# Patient Record
Sex: MALE | Race: ASIAN | NOT HISPANIC OR LATINO | URBAN - METROPOLITAN AREA
[De-identification: names, ages, dates, MRNs, and addresses within clinical notes are randomized per-mention and may not be internally consistent; named-entity substitution may affect disease eponyms.]

---

## 2018-03-28 ENCOUNTER — EMERGENCY (EMERGENCY)
Facility: HOSPITAL | Age: 73
LOS: 1 days | Discharge: HOME | End: 2018-03-28
Attending: EMERGENCY MEDICINE | Admitting: EMERGENCY MEDICINE
Payer: MEDICAID

## 2018-03-28 VITALS
TEMPERATURE: 98 F | OXYGEN SATURATION: 97 % | DIASTOLIC BLOOD PRESSURE: 72 MMHG | RESPIRATION RATE: 16 BRPM | HEART RATE: 68 BPM | SYSTOLIC BLOOD PRESSURE: 136 MMHG

## 2018-03-28 PROCEDURE — 99284 EMERGENCY DEPT VISIT MOD MDM: CPT

## 2018-03-28 RX ORDER — SODIUM CHLORIDE 9 MG/ML
1000 INJECTION INTRAMUSCULAR; INTRAVENOUS; SUBCUTANEOUS ONCE
Qty: 0 | Refills: 0 | Status: COMPLETED | OUTPATIENT
Start: 2018-03-28 | End: 2018-03-28

## 2018-03-28 RX ADMIN — SODIUM CHLORIDE 1000 MILLILITER(S): 9 INJECTION INTRAMUSCULAR; INTRAVENOUS; SUBCUTANEOUS at 23:54

## 2018-03-28 NOTE — ED ADULT TRIAGE NOTE - CHIEF COMPLAINT QUOTE
Pt comes in for c/o LLQ abd pain after taking 1st dose of amoxicillin today, accompanied with n/v. Pt symptoms now resolved and pt appears in no acute distress, vs as noted

## 2018-03-29 ENCOUNTER — TRANSCRIPTION ENCOUNTER (OUTPATIENT)
Age: 73
End: 2018-03-29

## 2018-03-29 LAB
ALBUMIN SERPL ELPH-MCNC: 4.1 G/DL — SIGNIFICANT CHANGE UP (ref 3.3–5)
ALP SERPL-CCNC: 29 U/L — LOW (ref 40–120)
ALT FLD-CCNC: 15 U/L — SIGNIFICANT CHANGE UP (ref 4–41)
APPEARANCE UR: CLEAR — SIGNIFICANT CHANGE UP
AST SERPL-CCNC: 21 U/L — SIGNIFICANT CHANGE UP (ref 4–40)
BASOPHILS # BLD AUTO: 0.02 K/UL — SIGNIFICANT CHANGE UP (ref 0–0.2)
BASOPHILS NFR BLD AUTO: 0.2 % — SIGNIFICANT CHANGE UP (ref 0–2)
BILIRUB SERPL-MCNC: 0.8 MG/DL — SIGNIFICANT CHANGE UP (ref 0.2–1.2)
BILIRUB UR-MCNC: NEGATIVE — SIGNIFICANT CHANGE UP
BLOOD UR QL VISUAL: NEGATIVE — SIGNIFICANT CHANGE UP
BUN SERPL-MCNC: 18 MG/DL — SIGNIFICANT CHANGE UP (ref 7–23)
CALCIUM SERPL-MCNC: 8.4 MG/DL — SIGNIFICANT CHANGE UP (ref 8.4–10.5)
CHLORIDE SERPL-SCNC: 102 MMOL/L — SIGNIFICANT CHANGE UP (ref 98–107)
CO2 SERPL-SCNC: 24 MMOL/L — SIGNIFICANT CHANGE UP (ref 22–31)
COLOR SPEC: SIGNIFICANT CHANGE UP
CREAT SERPL-MCNC: 1.02 MG/DL — SIGNIFICANT CHANGE UP (ref 0.5–1.3)
EOSINOPHIL # BLD AUTO: 0.13 K/UL — SIGNIFICANT CHANGE UP (ref 0–0.5)
EOSINOPHIL NFR BLD AUTO: 1.1 % — SIGNIFICANT CHANGE UP (ref 0–6)
GLUCOSE SERPL-MCNC: 108 MG/DL — HIGH (ref 70–99)
GLUCOSE UR-MCNC: NEGATIVE — SIGNIFICANT CHANGE UP
HCT VFR BLD CALC: 39.7 % — SIGNIFICANT CHANGE UP (ref 39–50)
HGB BLD-MCNC: 13.3 G/DL — SIGNIFICANT CHANGE UP (ref 13–17)
IMM GRANULOCYTES # BLD AUTO: 0.07 # — SIGNIFICANT CHANGE UP
IMM GRANULOCYTES NFR BLD AUTO: 0.6 % — SIGNIFICANT CHANGE UP (ref 0–1.5)
KETONES UR-MCNC: NEGATIVE — SIGNIFICANT CHANGE UP
LEUKOCYTE ESTERASE UR-ACNC: NEGATIVE — SIGNIFICANT CHANGE UP
LIDOCAIN IGE QN: 79 U/L — HIGH (ref 7–60)
LYMPHOCYTES # BLD AUTO: 0.8 K/UL — LOW (ref 1–3.3)
LYMPHOCYTES # BLD AUTO: 6.5 % — LOW (ref 13–44)
MCHC RBC-ENTMCNC: 31.4 PG — SIGNIFICANT CHANGE UP (ref 27–34)
MCHC RBC-ENTMCNC: 33.5 % — SIGNIFICANT CHANGE UP (ref 32–36)
MCV RBC AUTO: 93.6 FL — SIGNIFICANT CHANGE UP (ref 80–100)
MONOCYTES # BLD AUTO: 0.63 K/UL — SIGNIFICANT CHANGE UP (ref 0–0.9)
MONOCYTES NFR BLD AUTO: 5.1 % — SIGNIFICANT CHANGE UP (ref 2–14)
MUCOUS THREADS # UR AUTO: SIGNIFICANT CHANGE UP
NEUTROPHILS # BLD AUTO: 10.66 K/UL — HIGH (ref 1.8–7.4)
NEUTROPHILS NFR BLD AUTO: 86.5 % — HIGH (ref 43–77)
NITRITE UR-MCNC: NEGATIVE — SIGNIFICANT CHANGE UP
NRBC # FLD: 0 — SIGNIFICANT CHANGE UP
PH UR: 7 — SIGNIFICANT CHANGE UP (ref 4.6–8)
PLATELET # BLD AUTO: 133 K/UL — LOW (ref 150–400)
PMV BLD: 11.1 FL — SIGNIFICANT CHANGE UP (ref 7–13)
POTASSIUM SERPL-MCNC: 4.5 MMOL/L — SIGNIFICANT CHANGE UP (ref 3.5–5.3)
POTASSIUM SERPL-SCNC: 4.5 MMOL/L — SIGNIFICANT CHANGE UP (ref 3.5–5.3)
PROT SERPL-MCNC: 6.7 G/DL — SIGNIFICANT CHANGE UP (ref 6–8.3)
PROT UR-MCNC: NEGATIVE MG/DL — SIGNIFICANT CHANGE UP
RBC # BLD: 4.24 M/UL — SIGNIFICANT CHANGE UP (ref 4.2–5.8)
RBC # FLD: 13 % — SIGNIFICANT CHANGE UP (ref 10.3–14.5)
RBC CASTS # UR COMP ASSIST: SIGNIFICANT CHANGE UP (ref 0–?)
SODIUM SERPL-SCNC: 138 MMOL/L — SIGNIFICANT CHANGE UP (ref 135–145)
SP GR SPEC: 1.01 — SIGNIFICANT CHANGE UP (ref 1–1.04)
UROBILINOGEN FLD QL: NORMAL MG/DL — SIGNIFICANT CHANGE UP
WBC # BLD: 12.31 K/UL — HIGH (ref 3.8–10.5)
WBC # FLD AUTO: 12.31 K/UL — HIGH (ref 3.8–10.5)
WBC UR QL: SIGNIFICANT CHANGE UP (ref 0–?)

## 2018-03-29 PROCEDURE — 74177 CT ABD & PELVIS W/CONTRAST: CPT | Mod: 26

## 2018-03-29 NOTE — ED PROVIDER NOTE - MEDICAL DECISION MAKING DETAILS
73y/o M with LLQ pain. Given tenderness, will perform CT scan, check labs. His sx may simply be side effect of medication.

## 2018-03-29 NOTE — ED PROVIDER NOTE - CARE PLAN
Principal Discharge DX:	Abdominal pain  Assessment and plan of treatment:	Follow up with your primary care provider within 48 hours. Take copy of results with you. Follow up with Urology for enlarged, irregular prostate for further testing. Return to ER for any new or worsening symptoms, fever ,chills, vomiting, abdominal pain or any other concerns.

## 2018-03-29 NOTE — ED PROVIDER NOTE - PROGRESS NOTE DETAILS
MARY Mayes - received sign out from Dr. Thorpe. now following with Dr. Mackay. pt feeling better. denies any abd pain. exam: NT, ND, no rebound or guarding. pt aware of enlarged prostate and f/u with urologist. will follow up outpatient. amenable for dc

## 2018-03-29 NOTE — ED PROVIDER NOTE - PLAN OF CARE
Follow up with your primary care provider within 48 hours. Take copy of results with you. Follow up with Urology for enlarged, irregular prostate for further testing. Return to ER for any new or worsening symptoms, fever ,chills, vomiting, abdominal pain or any other concerns.

## 2018-03-29 NOTE — ED PROVIDER NOTE - OBJECTIVE STATEMENT
71y/o M with PMHx of BPH (on Flomax), presents to the ED c/o LLQ pain, nausea earlier today. Pt was started on Amoxicillin today for a possible dental infection. He afterward had nausea, which has since resolved, and he had abdominal pain, which has persisted. Denies fevers/chills, vomiting, dysuria, any other complaints. NKDA.

## 2018-03-30 LAB
BACTERIA UR CULT: SIGNIFICANT CHANGE UP
SPECIMEN SOURCE: SIGNIFICANT CHANGE UP

## 2018-04-02 PROBLEM — N40.0 BENIGN PROSTATIC HYPERPLASIA WITHOUT LOWER URINARY TRACT SYMPTOMS: Chronic | Status: ACTIVE | Noted: 2018-03-29

## 2018-04-09 ENCOUNTER — APPOINTMENT (OUTPATIENT)
Dept: UROLOGY | Facility: CLINIC | Age: 73
End: 2018-04-09
Payer: MEDICAID

## 2018-04-09 VITALS
BODY MASS INDEX: 22.45 KG/M2 | DIASTOLIC BLOOD PRESSURE: 71 MMHG | TEMPERATURE: 98.2 F | WEIGHT: 122 LBS | HEIGHT: 62 IN | SYSTOLIC BLOOD PRESSURE: 121 MMHG | RESPIRATION RATE: 16 BRPM | HEART RATE: 67 BPM

## 2018-04-09 DIAGNOSIS — Z87.891 PERSONAL HISTORY OF NICOTINE DEPENDENCE: ICD-10-CM

## 2018-04-09 DIAGNOSIS — Z78.9 OTHER SPECIFIED HEALTH STATUS: ICD-10-CM

## 2018-04-09 PROCEDURE — 99204 OFFICE O/P NEW MOD 45 MIN: CPT

## 2018-04-11 LAB
ANION GAP SERPL CALC-SCNC: 10 MMOL/L
APPEARANCE: CLEAR
BACTERIA UR CULT: NORMAL
BACTERIA: NEGATIVE
BILIRUBIN URINE: NEGATIVE
BLOOD URINE: ABNORMAL
BUN SERPL-MCNC: 16 MG/DL
CALCIUM SERPL-MCNC: 8.9 MG/DL
CHLORIDE SERPL-SCNC: 104 MMOL/L
CO2 SERPL-SCNC: 26 MMOL/L
COLOR: YELLOW
CREAT SERPL-MCNC: 1.01 MG/DL
GLUCOSE QUALITATIVE U: NEGATIVE MG/DL
GLUCOSE SERPL-MCNC: 101 MG/DL
HYALINE CASTS: 0 /LPF
KETONES URINE: NEGATIVE
LEUKOCYTE ESTERASE URINE: NEGATIVE
MICROSCOPIC-UA: NORMAL
NITRITE URINE: NEGATIVE
PH URINE: 5.5
POTASSIUM SERPL-SCNC: 4.4 MMOL/L
PROTEIN URINE: NEGATIVE MG/DL
PSA FREE FLD-MCNC: 24.1
PSA FREE SERPL-MCNC: 0.73 NG/ML
PSA SERPL-MCNC: 3.03 NG/ML
RED BLOOD CELLS URINE: 1 /HPF
SODIUM SERPL-SCNC: 140 MMOL/L
SPECIFIC GRAVITY URINE: 1.02
SQUAMOUS EPITHELIAL CELLS: 0 /HPF
UROBILINOGEN URINE: NEGATIVE MG/DL
WHITE BLOOD CELLS URINE: 0 /HPF

## 2018-07-24 ENCOUNTER — APPOINTMENT (OUTPATIENT)
Dept: UROLOGY | Facility: CLINIC | Age: 73
End: 2018-07-24
Payer: MEDICAID

## 2018-07-24 VITALS
WEIGHT: 133 LBS | OXYGEN SATURATION: 98 % | DIASTOLIC BLOOD PRESSURE: 70 MMHG | HEART RATE: 56 BPM | RESPIRATION RATE: 18 BRPM | TEMPERATURE: 97.6 F | BODY MASS INDEX: 24.33 KG/M2 | SYSTOLIC BLOOD PRESSURE: 127 MMHG

## 2018-07-24 PROCEDURE — 99214 OFFICE O/P EST MOD 30 MIN: CPT

## 2019-01-02 ENCOUNTER — APPOINTMENT (OUTPATIENT)
Dept: VASCULAR SURGERY | Facility: CLINIC | Age: 74
End: 2019-01-02
Payer: MEDICARE

## 2019-01-02 VITALS
HEIGHT: 62 IN | BODY MASS INDEX: 23.92 KG/M2 | DIASTOLIC BLOOD PRESSURE: 77 MMHG | WEIGHT: 130 LBS | TEMPERATURE: 98 F | HEART RATE: 60 BPM | SYSTOLIC BLOOD PRESSURE: 125 MMHG

## 2019-01-02 PROCEDURE — 93971 EXTREMITY STUDY: CPT

## 2019-01-02 PROCEDURE — 93923 UPR/LXTR ART STDY 3+ LVLS: CPT

## 2019-01-02 PROCEDURE — 99203 OFFICE O/P NEW LOW 30 MIN: CPT

## 2019-01-02 RX ORDER — TAMSULOSIN HYDROCHLORIDE 0.4 MG/1
0.4 CAPSULE ORAL
Refills: 0 | Status: COMPLETED | COMMUNITY
End: 2019-01-02

## 2019-01-15 ENCOUNTER — APPOINTMENT (OUTPATIENT)
Dept: VASCULAR SURGERY | Facility: CLINIC | Age: 74
End: 2019-01-15
Payer: MEDICARE

## 2019-01-15 PROCEDURE — 93971 EXTREMITY STUDY: CPT

## 2019-01-15 PROCEDURE — 99213 OFFICE O/P EST LOW 20 MIN: CPT

## 2019-01-15 NOTE — ASSESSMENT
[FreeTextEntry1] : 72 yo male with history of bph presents for evaluation of b/l lower extremity varicose veins with left lower extremity pain was found to have severe venous insufficency in addition to left lower extremity svt\par repeat duplex shows stable svt no evidence of propigation \par will continue with asa 81 mg daily and motrin as needed for pain\par will repeat duplex in 3 weeks at that time will further discuss ablation

## 2019-01-15 NOTE — PHYSICAL EXAM
[JVD] : no jugular venous distention  [1+] : left 1+ [2+] : right 2+ [0] : left 0 [Ankle Swelling (On Exam)] : not present [Varicose Veins Of Lower Extremities] : bilaterally [] : of the left leg [Ankle Swelling Bilaterally] : severe [No Rash or Lesion] : No rash or lesion [Skin Ulcer] : no ulcer [Alert] : alert [Calm] : calm [de-identified] : appears well [de-identified] : motor intact b/l lower extremities, muscle strength 5/5 with dorsi and plantar flexion\par

## 2019-01-15 NOTE — HISTORY OF PRESENT ILLNESS
[FreeTextEntry1] : 72 yo male with history of bph presents for evaluation of b/l lower extremity varicose veins with left lower extremity pain was found to have severe venous insufficency in addition to left lower extremity svt. he was started on asa and motrin as needed for pain.  pt without any new related complaints, he reports a recent fall yesterday where he twisted his ankle and hurt the right posterior thigh.  \par

## 2019-01-25 ENCOUNTER — APPOINTMENT (OUTPATIENT)
Dept: VASCULAR SURGERY | Facility: CLINIC | Age: 74
End: 2019-01-25

## 2019-02-07 ENCOUNTER — APPOINTMENT (OUTPATIENT)
Dept: VASCULAR SURGERY | Facility: CLINIC | Age: 74
End: 2019-02-07
Payer: MEDICARE

## 2019-02-07 PROCEDURE — 93971 EXTREMITY STUDY: CPT

## 2019-02-07 PROCEDURE — 99214 OFFICE O/P EST MOD 30 MIN: CPT

## 2019-02-07 NOTE — HISTORY OF PRESENT ILLNESS
[FreeTextEntry1] : 72 yo male with history of bph presents for evaluation of b/l lower extremity varicose veins with left lower extremity pain was found to have severe venous insufficiency in addition to left lower extremity svt. he was started on asa and motrin as needed for pain.  pt without any new related complaints, he reports a recent fall yesterday where he twisted his ankle and hurt the right posterior thigh.  \par

## 2019-02-07 NOTE — PHYSICAL EXAM
[JVD] : no jugular venous distention  [Normal Breath Sounds] : Normal breath sounds [Normal Rate and Rhythm] : normal rate and rhythm [1+] : left 1+ [2+] : right 2+ [0] : left 0 [Ankle Swelling (On Exam)] : not present [Varicose Veins Of Lower Extremities] : bilaterally [] : of the left leg [Ankle Swelling Bilaterally] : severe [Abdomen Tenderness] : ~T ~M No abdominal tenderness [No Rash or Lesion] : No rash or lesion [Skin Ulcer] : no ulcer [Alert] : alert [Calm] : calm [de-identified] : appears well [de-identified] : motor intact b/l lower extremities, muscle strength 5/5 with dorsi and plantar flexion\par

## 2019-02-07 NOTE — ASSESSMENT
[FreeTextEntry1] : 74 yo male with history of bph presents for evaluation of b/l lower extremity varicose veins with left lower extremity pain was found to have severe venous insufficiency in addition to left lower extremity svt\par repeat duplex shows stable svt no evidence of propagation \par will continue with asa 81 mg daily and motrin as needed for pain\par f/u as needed\par

## 2019-07-26 ENCOUNTER — APPOINTMENT (OUTPATIENT)
Dept: UROLOGY | Facility: CLINIC | Age: 74
End: 2019-07-26
Payer: MEDICARE

## 2019-07-26 VITALS
BODY MASS INDEX: 23.59 KG/M2 | RESPIRATION RATE: 18 BRPM | WEIGHT: 129 LBS | DIASTOLIC BLOOD PRESSURE: 75 MMHG | HEART RATE: 58 BPM | OXYGEN SATURATION: 98 % | SYSTOLIC BLOOD PRESSURE: 123 MMHG | TEMPERATURE: 98.2 F

## 2019-07-26 LAB
ANION GAP SERPL CALC-SCNC: 11 MMOL/L
BUN SERPL-MCNC: 21 MG/DL
CALCIUM SERPL-MCNC: 9.2 MG/DL
CHLORIDE SERPL-SCNC: 103 MMOL/L
CO2 SERPL-SCNC: 26 MMOL/L
CREAT SERPL-MCNC: 1.12 MG/DL
GLUCOSE SERPL-MCNC: 82 MG/DL
POTASSIUM SERPL-SCNC: 5.2 MMOL/L
PSA FREE FLD-MCNC: 24 %
PSA FREE SERPL-MCNC: 0.3 NG/ML
PSA SERPL-MCNC: 1.24 NG/ML
SODIUM SERPL-SCNC: 140 MMOL/L

## 2019-07-26 PROCEDURE — 99214 OFFICE O/P EST MOD 30 MIN: CPT

## 2019-07-28 NOTE — ADDENDUM
[FreeTextEntry1] : Entered by Dani Abdul, acting as scribe for Dr. Jeremias More.\par \par The documentation recorded by the scribe accurately reflects the service I personally performed and the decisions made by me.

## 2019-07-28 NOTE — LETTER BODY
[FreeTextEntry1] : Sara Machado MD\par [address]\[place] [address]\par [phone]\par \par Dear Dr. Ruiz, \par \par Reason for visit: BPH\par \par This is a 74 year-old gentleman with lower urinary tract symptoms and BPH. He returns today for follow up. Since his last visit, patient reports taking Flomax twice a day and Proscar regularly with no side effects or difficulties with medication. He reports improved urinary symptoms with the medication. He denies any hematuria, dysuria or urinary incontinence. His symptoms are aggravated by hydration. He denies any pain or changes in health. The past medical history, family history and social history are unchanged. All other review of systems are negative. Patient denies any changes in medications. Medication list was reconciled.\par  \par On examination, the patient is a healthy-appearing gentleman in no acute distress. He is alert and oriented follows commands. He has normal mood and affect. He is normocephalic. Oral no thrush. Neck is supple. Respirations are unlabored. His abdomen is soft and nontender. Liver is nonpalpable. Bladder is nonpalpable. No CVA tenderness. Neurologically he is grossly intact. No peripheral edema. Skin without gross abnormality. \par \par His previous BMP in 2018 demonstrated normal renal functions, creatinine 1.01. His previous PSA in 2019 was 3.03, which is within normal limits.\par \par ASSESSMENT: BPH, improved on Flomax BID and Proscar. \par \par I counseled the patient. In terms of his BPH, the patient reports stable symptoms with medications. I renewed his prescription for Flomax and Proscar. I encourage the patient to continue Flomax BID and Proscar regularly as directed. He will repeat PSA today to ensure stability. He will also obtain BMP today. Risks and alternatives were discussed. I answered the patient questions. The patient will follow-up as directed and will contact me with any questions or concerns. Thank you for the opportunity to participate in the care of Mr. TREVINO. I will keep you updated on his progress. \par \par Plan: Continue Flomax BID and Proscar. Repeat PSA. BMP. Follow up in 1 year.

## 2019-07-29 LAB — URINE CYTOLOGY: NORMAL

## 2020-06-30 NOTE — ED PROVIDER NOTE - NS_ATTENDINGSCRIBE_ED_ALL_ED
I personally performed the service described in the documentation recorded by the scribe in my presence, and it accurately and completely records my words and actions. 01-Jul-2020 00:38

## 2020-07-21 ENCOUNTER — APPOINTMENT (OUTPATIENT)
Dept: UROLOGY | Facility: CLINIC | Age: 75
End: 2020-07-21

## 2020-07-31 ENCOUNTER — APPOINTMENT (OUTPATIENT)
Dept: UROLOGY | Facility: CLINIC | Age: 75
End: 2020-07-31
Payer: MEDICARE

## 2020-07-31 VITALS
RESPIRATION RATE: 18 BRPM | SYSTOLIC BLOOD PRESSURE: 145 MMHG | DIASTOLIC BLOOD PRESSURE: 73 MMHG | TEMPERATURE: 97.3 F | BODY MASS INDEX: 23.41 KG/M2 | OXYGEN SATURATION: 100 % | WEIGHT: 128 LBS | HEART RATE: 67 BPM

## 2020-07-31 PROCEDURE — 99214 OFFICE O/P EST MOD 30 MIN: CPT

## 2020-07-31 RX ORDER — FINASTERIDE 5 MG/1
5 TABLET, FILM COATED ORAL DAILY
Qty: 90 | Refills: 3 | Status: COMPLETED | COMMUNITY
Start: 2018-04-09 | End: 2020-07-31

## 2020-08-01 LAB
ANION GAP SERPL CALC-SCNC: 11 MMOL/L
BUN SERPL-MCNC: 17 MG/DL
CALCIUM SERPL-MCNC: 9.1 MG/DL
CHLORIDE SERPL-SCNC: 106 MMOL/L
CO2 SERPL-SCNC: 25 MMOL/L
CREAT SERPL-MCNC: 1.05 MG/DL
GLUCOSE SERPL-MCNC: 109 MG/DL
POTASSIUM SERPL-SCNC: 3.6 MMOL/L
PSA FREE FLD-MCNC: 26 %
PSA FREE SERPL-MCNC: 0.41 NG/ML
PSA SERPL-MCNC: 1.54 NG/ML
SODIUM SERPL-SCNC: 142 MMOL/L

## 2020-08-03 NOTE — LETTER BODY
[FreeTextEntry1] : Carter Ruiz MD\par [address]\[place] [address]\par [phone]\par \par Dear Dr. Ruiz, \par \par Reason for visit: BPH\par \par This is a 75 year-old gentleman with lower urinary tract symptoms and BPH. He returns today for follow up. Since his last visit, he continues to take Flomax BIDwithout any side effects or difficulties. He is doing well and his symptoms are stable on medical therapy. He denies any pain or changes in health. The past medical history, family history and social history are unchanged. All other review of systems are negative. Patient denies any changes in medications. Medication list was reconciled.\par  \par On examination, the patient is a healthy-appearing gentleman in no acute distress. He is alert and oriented follows commands. He has normal mood and affect. He is normocephalic. Oral no thrush. Neck is supple. Respirations are unlabored. His abdomen is soft and nontender. Liver is nonpalpable. Bladder is nonpalpable. No CVA tenderness. Neurologically he is grossly intact. No peripheral edema. Skin without gross abnormality. \par \par His BMP last year demonstrated normal renal functions, creatinine 1.12. His PSA was 1.24, which is within normal limits. His urine cytology was negative.\par \par ASSESSMENT: BPH, improved on Flomax BID and Proscar. \par \par I counseled the patient. In terms of his BPH, the patient reports stable symptoms with medications. I renewed his prescription for Flomax BID. I encourage the patient to continue taking his medications as directed. He will repeat BMP and PSA today to ensure stability. Risks and alternatives were discussed. I answered the patient questions. Patient understands that if he develops gross hematuria or any urinary discomfort, he will contact me for further evaluation. The patient will follow-up as directed and will contact me with any questions or concerns. Thank you for the opportunity to participate in the care of Mr. TREVINO. I will keep you updated on his progress. \par \par Plan: Continue Flomax BID. Repeat PSA. BMP. Follow up in 1 year.

## 2020-08-03 NOTE — ADDENDUM
[FreeTextEntry1] : Entered by Kostas Jones, acting as scribe for Dr. Jeremias More.\par \par The documentation recorded by the scribe accurately reflects the service I personally performed and the decisions made by me.

## 2021-02-26 ENCOUNTER — APPOINTMENT (OUTPATIENT)
Dept: UROLOGY | Facility: CLINIC | Age: 76
End: 2021-02-26

## 2021-04-06 ENCOUNTER — APPOINTMENT (OUTPATIENT)
Dept: UROLOGY | Facility: CLINIC | Age: 76
End: 2021-04-06
Payer: MEDICARE

## 2021-04-06 VITALS
TEMPERATURE: 97.5 F | BODY MASS INDEX: 24.47 KG/M2 | HEART RATE: 63 BPM | SYSTOLIC BLOOD PRESSURE: 155 MMHG | WEIGHT: 133.8 LBS | RESPIRATION RATE: 18 BRPM | OXYGEN SATURATION: 99 % | DIASTOLIC BLOOD PRESSURE: 82 MMHG

## 2021-04-06 PROCEDURE — 99072 ADDL SUPL MATRL&STAF TM PHE: CPT

## 2021-04-06 PROCEDURE — 99214 OFFICE O/P EST MOD 30 MIN: CPT

## 2021-04-06 NOTE — LETTER BODY
[FreeTextEntry1] : Carter Ruiz MD\par [address]\[place] [address]\par [phone]\par \par Dear Dr. Ruiz, \par \par Reason for visit: BPH.\par \par This is a 75 year-old gentleman with chronic BPH. The patient returns today for follow up. Since he was last seen, the patient reports seeing Dr. Luisa Tucker. He was found to have elevated calcium level. Patient underwent a CT scan, which demonstrated a slightly enlarged prostate. The patient continues to take Flomax BID regularly without any side effects or difficulties with the medication. Patient notes stable strong urine flow and stable urinary symptoms on medical therapy. He denies any pain or changes in health. Patient is overall well. The past medical history, family history and social history are unchanged. All other review of systems are negative. Patient denies any changes in medications. Medication list was reconciled.\par  \par On examination, the patient is a healthy-appearing gentleman in no acute distress. He is alert and oriented follows commands. He has normal mood and affect. He is normocephalic. Oral no thrush. Neck is supple. Respirations are unlabored. His abdomen is soft and nontender. Liver is nonpalpable. Bladder is nonpalpable. No CVA tenderness. Neurologically he is grossly intact. No peripheral edema. Skin without gross abnormality. \par \par His most recent BMP in July 2020 demonstrated normal renal functions, creatinine 1.05. His PSA was 1.54, which is within normal limits.\par \par I personally reviewed CT scan images with the patient today and images demonstrated a slightly enlarged prostate gland, consistent with BPH.\par \par ASSESSMENT: BPH, symptoms stable on Flomax BID.\par \par I counseled the patient. He is doing well. In terms of his BPH, his symptoms are stable on medical therapy. I recommended he continue taking Flomax BID. I renewed the patient's prescription for Flomax BID today. I encouraged the patient to continue medications regularly as directed. He will repeat PSA today to ensure stability Risks and alternatives were discussed. I answered the patient questions. The patient will follow-up in 1 year and will contact me with any questions or concerns. Thank you for the opportunity to participate in the care of Mr. QU. I will keep you updated on his progress.\par \par Plan: Continue Flomax BID. PSA. Follow-up in 1 year.

## 2021-04-06 NOTE — ADDENDUM
[FreeTextEntry1] : Entered by Alexey Patrick, acting as scribe for Dr. Jeremias More.\par \par The documentation recorded by the scribe accurately reflects the service I personally performed and the decisions made by me.\par

## 2021-04-09 LAB
PSA FREE FLD-MCNC: 29 %
PSA FREE SERPL-MCNC: 0.73 NG/ML
PSA SERPL-MCNC: 2.55 NG/ML

## 2022-04-05 ENCOUNTER — APPOINTMENT (OUTPATIENT)
Dept: UROLOGY | Facility: CLINIC | Age: 77
End: 2022-04-05

## 2022-11-01 ENCOUNTER — APPOINTMENT (OUTPATIENT)
Dept: UROLOGY | Facility: CLINIC | Age: 77
End: 2022-11-01

## 2022-11-01 VITALS
OXYGEN SATURATION: 98 % | HEART RATE: 70 BPM | TEMPERATURE: 98 F | DIASTOLIC BLOOD PRESSURE: 66 MMHG | RESPIRATION RATE: 18 BRPM | WEIGHT: 133 LBS | BODY MASS INDEX: 24.33 KG/M2 | SYSTOLIC BLOOD PRESSURE: 117 MMHG

## 2022-11-01 DIAGNOSIS — Z00.00 ENCOUNTER FOR GENERAL ADULT MEDICAL EXAMINATION W/OUT ABNORMAL FINDINGS: ICD-10-CM

## 2022-11-01 PROCEDURE — 99214 OFFICE O/P EST MOD 30 MIN: CPT

## 2022-11-01 RX ORDER — CYCLOSPORINE 0.5 MG/ML
0.05 EMULSION OPHTHALMIC
Qty: 60 | Refills: 0 | Status: ACTIVE | COMMUNITY
Start: 2022-09-13

## 2022-11-01 RX ORDER — DICLOFENAC SODIUM 1% 10 MG/G
1 GEL TOPICAL
Qty: 100 | Refills: 0 | Status: ACTIVE | COMMUNITY
Start: 2022-10-08

## 2022-11-01 RX ORDER — SILVER SULFADIAZINE 10 MG/G
1 CREAM TOPICAL
Qty: 50 | Refills: 0 | Status: ACTIVE | COMMUNITY
Start: 2022-08-17

## 2022-11-01 RX ORDER — MUPIROCIN 20 MG/G
2 OINTMENT TOPICAL
Qty: 22 | Refills: 0 | Status: ACTIVE | COMMUNITY
Start: 2022-09-13

## 2022-11-01 RX ORDER — DICLOFENAC EPOLAMINE 0.01 G/1
1.3 SYSTEM TOPICAL
Qty: 60 | Refills: 0 | Status: ACTIVE | COMMUNITY
Start: 2022-10-08

## 2022-11-01 RX ORDER — CIPROFLOXACIN AND DEXAMETHASONE 3; 1 MG/ML; MG/ML
0.3-0.1 SUSPENSION/ DROPS AURICULAR (OTIC)
Qty: 8 | Refills: 0 | Status: ACTIVE | COMMUNITY
Start: 2022-09-07

## 2022-11-01 RX ORDER — NIRMATRELVIR AND RITONAVIR 300-100 MG
20 X 150 MG & KIT ORAL
Qty: 30 | Refills: 0 | Status: ACTIVE | COMMUNITY
Start: 2022-05-11

## 2022-11-01 NOTE — ADDENDUM
[FreeTextEntry1] : Entered by KINGSTON ADAMES, acting as scribe for Dr. Jeremias More.\par The documentation recorded by the scribe accurately reflects the service I personally performed and the decisions made by me.

## 2022-11-01 NOTE — LETTER BODY
[FreeTextEntry1] : Carter Ruiz MD\par [address]\[place] [address]\par [phone]\par \par Dear Dr. Ruiz, \par \par Reason for visit: BPH.\par \par This is a 77 year-old gentleman with chronic BPH. The patient under prostate MRI in March 2021 which demonstrated slightly enlarged prostate gland, consistent with BPH. The patient reports seeing Dr. Luisa Tucker. He was found to have elevated calcium level. The patient returns today for follow up. Since he was last seen, the patient continues to take Flomax BID regularly without any side effects or difficulties with the medication. Patient notes stable strong urine flow and stable urinary symptoms on medical therapy. He denies any pain or changes in health. Patient is overall well. The past medical history, family history and social history are unchanged. All other review of systems are negative. Patient denies any changes in medications. Medication list was reconciled.\par  \par On examination, the patient is a healthy-appearing gentleman in no acute distress. He is alert and oriented follows commands. He has normal mood and affect. He is normocephalic. Oral no thrush. Neck is supple. Respirations are unlabored. His abdomen is soft and nontender. Liver is nonpalpable. Bladder is nonpalpable. No CVA tenderness. Neurologically he is grossly intact. No peripheral edema. Skin without gross abnormality. \par \par His PSA in April 2021 was 2.55, which is within normal limits.\par \par ASSESSMENT: BPH, symptoms stable on Flomax BID.\par \par I counseled the patient. He is doing well. In terms of his BPH, his symptoms are stable on medical therapy. I recommended he continue taking Flomax BID. I renewed the patient's prescription for Flomax BID today. I encouraged the patient to continue medications regularly as directed. He will repeat PSA and BMP today to ensure stability Risks and alternatives were discussed. I answered the patient questions. The patient will follow-up in 1 year and will contact me with any questions or concerns. Thank you for the opportunity to participate in the care of Mr. TREVINO. I will keep you updated on his progress.\par \par Plan: Continue Flomax BID. PSA. BMP. Follow-up in 1 year.

## 2022-12-21 ENCOUNTER — APPOINTMENT (OUTPATIENT)
Dept: CARDIOLOGY | Facility: CLINIC | Age: 77
End: 2022-12-21

## 2022-12-21 VITALS
HEART RATE: 74 BPM | SYSTOLIC BLOOD PRESSURE: 101 MMHG | OXYGEN SATURATION: 99 % | DIASTOLIC BLOOD PRESSURE: 65 MMHG | TEMPERATURE: 98.2 F | BODY MASS INDEX: 24.33 KG/M2 | WEIGHT: 133 LBS | RESPIRATION RATE: 18 BRPM

## 2022-12-21 PROCEDURE — 93015 CV STRESS TEST SUPVJ I&R: CPT

## 2022-12-21 PROCEDURE — 93306 TTE W/DOPPLER COMPLETE: CPT

## 2022-12-21 PROCEDURE — 99204 OFFICE O/P NEW MOD 45 MIN: CPT | Mod: 25

## 2023-10-31 ENCOUNTER — APPOINTMENT (OUTPATIENT)
Dept: UROLOGY | Facility: CLINIC | Age: 78
End: 2023-10-31
Payer: MEDICARE

## 2023-10-31 VITALS
TEMPERATURE: 98 F | OXYGEN SATURATION: 99 % | BODY MASS INDEX: 24.51 KG/M2 | HEART RATE: 64 BPM | RESPIRATION RATE: 18 BRPM | WEIGHT: 134 LBS | DIASTOLIC BLOOD PRESSURE: 76 MMHG | SYSTOLIC BLOOD PRESSURE: 121 MMHG

## 2023-10-31 PROCEDURE — 99214 OFFICE O/P EST MOD 30 MIN: CPT

## 2023-11-01 LAB
APPEARANCE: CLEAR
BACTERIA: NEGATIVE /HPF
BILIRUBIN URINE: NEGATIVE
BLOOD URINE: ABNORMAL
CALCIUM OXALATE CRYSTALS: PRESENT
CAST: 2 /LPF
COLOR: NORMAL
EPITHELIAL CELLS: 0 /HPF
GLUCOSE QUALITATIVE U: NEGATIVE MG/DL
KETONES URINE: NEGATIVE MG/DL
LEUKOCYTE ESTERASE URINE: NEGATIVE
MICROSCOPIC-UA: NORMAL
NITRITE URINE: NEGATIVE
PH URINE: 6
PROTEIN URINE: NORMAL MG/DL
RED BLOOD CELLS URINE: 5 /HPF
REVIEW: NORMAL
SPECIFIC GRAVITY URINE: 1.02
URINE CYTOLOGY: NORMAL
UROBILINOGEN URINE: 0.2 MG/DL
WHITE BLOOD CELLS URINE: 0 /HPF

## 2023-11-04 LAB
APPEARANCE: CLEAR
BACTERIA: NEGATIVE /HPF
BILIRUBIN URINE: NEGATIVE
BLOOD URINE: NEGATIVE
CAST: 0 /LPF
COLOR: YELLOW
EPITHELIAL CELLS: 0 /HPF
GLUCOSE QUALITATIVE U: NEGATIVE MG/DL
KETONES URINE: NEGATIVE MG/DL
LEUKOCYTE ESTERASE URINE: NEGATIVE
MICROSCOPIC-UA: NORMAL
NITRITE URINE: NEGATIVE
PH URINE: 6
PROTEIN URINE: NEGATIVE MG/DL
RED BLOOD CELLS URINE: 0 /HPF
SPECIFIC GRAVITY URINE: 1.01
UROBILINOGEN URINE: 0.2 MG/DL
WHITE BLOOD CELLS URINE: 0 /HPF

## 2023-11-06 LAB — URINE CYTOLOGY: NORMAL

## 2023-11-07 ENCOUNTER — NON-APPOINTMENT (OUTPATIENT)
Age: 78
End: 2023-11-07

## 2023-11-28 ENCOUNTER — APPOINTMENT (OUTPATIENT)
Dept: UROLOGY | Facility: CLINIC | Age: 78
End: 2023-11-28
Payer: MEDICARE

## 2023-11-28 VITALS
DIASTOLIC BLOOD PRESSURE: 82 MMHG | TEMPERATURE: 97.8 F | BODY MASS INDEX: 24.86 KG/M2 | HEART RATE: 88 BPM | RESPIRATION RATE: 18 BRPM | WEIGHT: 135.9 LBS | OXYGEN SATURATION: 99 % | SYSTOLIC BLOOD PRESSURE: 161 MMHG

## 2023-11-28 PROCEDURE — 52000 CYSTOURETHROSCOPY: CPT

## 2023-12-08 LAB — URINE CYTOLOGY: NORMAL

## 2023-12-13 ENCOUNTER — NON-APPOINTMENT (OUTPATIENT)
Age: 78
End: 2023-12-13

## 2023-12-13 ENCOUNTER — APPOINTMENT (OUTPATIENT)
Dept: CARDIOLOGY | Facility: CLINIC | Age: 78
End: 2023-12-13
Payer: MEDICARE

## 2023-12-13 VITALS
DIASTOLIC BLOOD PRESSURE: 73 MMHG | WEIGHT: 134 LBS | SYSTOLIC BLOOD PRESSURE: 121 MMHG | TEMPERATURE: 96.8 F | OXYGEN SATURATION: 98 % | RESPIRATION RATE: 18 BRPM | HEART RATE: 62 BPM | BODY MASS INDEX: 24.51 KG/M2

## 2023-12-13 DIAGNOSIS — R06.02 SHORTNESS OF BREATH: ICD-10-CM

## 2023-12-13 PROCEDURE — 93000 ELECTROCARDIOGRAM COMPLETE: CPT

## 2023-12-13 PROCEDURE — 99214 OFFICE O/P EST MOD 30 MIN: CPT

## 2023-12-26 NOTE — HISTORY OF PRESENT ILLNESS
[FreeTextEntry1] : 78 year-old male with BPH presents for evaluation of SOB. Patient reports SOB with exertion. Patient denies CP. Patient denies palpitations. He reports one episode of syncope 20 years ago in the bathroom at night. Patient is on 2 BPH and stomach  medications.  His BP is 115-110-90. I advised patient to undergo an echocardiogram and a treadmill stress test.

## 2023-12-26 NOTE — HISTORY OF PRESENT ILLNESS
[FreeTextEntry1] : 78 year-old male with BPH presents for evaluation of SOB. Patient reports SOB with exertion. Patient denies CP. Patient denies palpitations. He reports one episode of syncope 20 years ago in the bathroom at night. Patient is on 2 BPH and stomach  medications.  His BP is 115-110-90. I advised patient to undergo an echocardiogram and a treadmill stress test.   12/13/23 - Patient reports abnormal CXR showing thoracic aortic atherosclerosis. He has SOB and denies CP or palpitations. I advised patient to get CTA CA.

## 2024-05-28 ENCOUNTER — APPOINTMENT (OUTPATIENT)
Dept: UROLOGY | Facility: CLINIC | Age: 79
End: 2024-05-28
Payer: MEDICARE

## 2024-05-28 VITALS
DIASTOLIC BLOOD PRESSURE: 63 MMHG | HEART RATE: 60 BPM | RESPIRATION RATE: 18 BRPM | OXYGEN SATURATION: 99 % | WEIGHT: 134 LBS | SYSTOLIC BLOOD PRESSURE: 112 MMHG | TEMPERATURE: 97.6 F | BODY MASS INDEX: 24.51 KG/M2

## 2024-05-28 DIAGNOSIS — N13.8 BENIGN PROSTATIC HYPERPLASIA WITH LOWER URINARY TRACT SYMPMS: ICD-10-CM

## 2024-05-28 DIAGNOSIS — N40.1 BENIGN PROSTATIC HYPERPLASIA WITH LOWER URINARY TRACT SYMPMS: ICD-10-CM

## 2024-05-28 DIAGNOSIS — N40.0 BENIGN PROSTATIC HYPERPLASIA WITHOUT LOWER URINARY TRACT SYMPMS: ICD-10-CM

## 2024-05-28 DIAGNOSIS — R82.90 UNSPECIFIED ABNORMAL FINDINGS IN URINE: ICD-10-CM

## 2024-05-28 PROCEDURE — 99213 OFFICE O/P EST LOW 20 MIN: CPT

## 2024-05-28 PROCEDURE — G2211 COMPLEX E/M VISIT ADD ON: CPT

## 2024-05-28 RX ORDER — TAMSULOSIN HYDROCHLORIDE 0.4 MG/1
0.4 CAPSULE ORAL
Qty: 180 | Refills: 3 | Status: ACTIVE | COMMUNITY
Start: 2018-04-09 | End: 1900-01-01

## 2024-05-28 NOTE — LETTER BODY
[FreeTextEntry1] : Dani Watters MD [address], [place], [place] [address] [phone]  Dear Dr. Watters,  Reason for visit: BPH. Abnormal urinalysis.    This is a 78 year-old gentleman with chronic BPH and abnormal urinalysis. The patient under prostate MRI in March 2021 which demonstrated slightly enlarged prostate gland, consistent with BPH. The patient reports seeing Dr. Luisa Tucker. He was found to have elevated calcium level. The patient returns today for follow up. Since he was last seen, the patient continues to take Flomax BID regularly without any side effects or difficulties with the medication. Patient notes stable strong urine flow and stable urinary symptoms on medical therapy. He denies any pain or changes in health. Patient is overall well. The past medical history, family history and social history are unchanged. All other review of systems are negative. Patient denies any changes in medications. Medication list was reconciled.    On examination, the patient is a healthy-appearing gentleman in no acute distress. He is alert and oriented follows commands. He has normal mood and affect. He is normocephalic. Oral no thrush. Neck is supple. Respirations are unlabored. His abdomen is soft and nontender. Liver is nonpalpable. Bladder is nonpalpable. No CVA tenderness. Neurologically he is grossly intact. No peripheral edema. Skin without gross abnormality.    His PSA recently was 3.3. Last PSA was 3.0.    ASSESSMENT: BPH, symptoms stable on Flomax BID.    I counseled the patient. He is doing well. In terms of his BPH, his symptoms are stable on medical therapy. I recommended he continue taking Flomax BID. I renewed the patient's prescription for Flomax BID today. I encouraged the patient to continue medications regularly as directed. He will repeat PSA and BMP today to ensure stability. In terms of his hematuria, patient will repeat urinalysis and urine cytology to look for high grade urothelial carcinoma. Risks and alternatives were discussed. I answered the patient questions. The patient will follow-up in 1 year and will contact me with any questions or concerns. Thank you for the opportunity to participate in the care of Mr. TREVINO. I will keep you updated on his progress.    Plan: Continue Flomax BID. PSA. BMP. Urinalysis. Cytology. Follow-up in 1 year.

## 2024-05-28 NOTE — ADDENDUM
[FreeTextEntry1] : Entered by Gorge Bundy, acting as scribe for Dr. Jeremias More. The documentation recorded by the scribe accurately reflects the service I personally performed and the decisions made by me.

## 2024-06-02 LAB — URINE CYTOLOGY: NORMAL

## 2024-10-22 ENCOUNTER — APPOINTMENT (OUTPATIENT)
Dept: UROLOGY | Facility: CLINIC | Age: 79
End: 2024-10-22
Payer: MEDICARE

## 2024-10-22 VITALS
OXYGEN SATURATION: 100 % | DIASTOLIC BLOOD PRESSURE: 74 MMHG | BODY MASS INDEX: 23.78 KG/M2 | HEART RATE: 62 BPM | SYSTOLIC BLOOD PRESSURE: 125 MMHG | RESPIRATION RATE: 18 BRPM | WEIGHT: 130 LBS

## 2024-10-22 DIAGNOSIS — R31.21 ASYMPTOMATIC MICROSCOPIC HEMATURIA: ICD-10-CM

## 2024-10-22 DIAGNOSIS — N40.1 BENIGN PROSTATIC HYPERPLASIA WITH LOWER URINARY TRACT SYMPMS: ICD-10-CM

## 2024-10-22 DIAGNOSIS — R82.90 UNSPECIFIED ABNORMAL FINDINGS IN URINE: ICD-10-CM

## 2024-10-22 DIAGNOSIS — N13.8 BENIGN PROSTATIC HYPERPLASIA WITH LOWER URINARY TRACT SYMPMS: ICD-10-CM

## 2024-10-22 PROCEDURE — 99214 OFFICE O/P EST MOD 30 MIN: CPT

## 2024-10-22 PROCEDURE — G2211 COMPLEX E/M VISIT ADD ON: CPT

## 2024-10-23 LAB
APPEARANCE: CLEAR
BACTERIA: NEGATIVE /HPF
BILIRUBIN URINE: NEGATIVE
BLOOD URINE: NEGATIVE
CAST: 0 /LPF
COLOR: NORMAL
EPITHELIAL CELLS: 1 /HPF
GLUCOSE QUALITATIVE U: NEGATIVE MG/DL
KETONES URINE: ABNORMAL MG/DL
LEUKOCYTE ESTERASE URINE: ABNORMAL
MICROSCOPIC-UA: NORMAL
NITRITE URINE: NEGATIVE
PH URINE: 5.5
PROTEIN URINE: 30 MG/DL
RED BLOOD CELLS URINE: 6 /HPF
REVIEW: NORMAL
SPECIFIC GRAVITY URINE: 1.02
UROBILINOGEN URINE: 0.2 MG/DL
WHITE BLOOD CELLS URINE: 0 /HPF

## 2024-10-24 LAB — URINE CYTOLOGY: NORMAL

## 2025-05-27 ENCOUNTER — APPOINTMENT (OUTPATIENT)
Dept: UROLOGY | Facility: CLINIC | Age: 80
End: 2025-05-27